# Patient Record
Sex: MALE | NOT HISPANIC OR LATINO | Employment: FULL TIME | URBAN - METROPOLITAN AREA
[De-identification: names, ages, dates, MRNs, and addresses within clinical notes are randomized per-mention and may not be internally consistent; named-entity substitution may affect disease eponyms.]

---

## 2024-08-15 ENCOUNTER — TRANSCRIBE ORDERS (OUTPATIENT)
Dept: ADMINISTRATIVE | Facility: HOSPITAL | Age: 64
End: 2024-08-15
Payer: COMMERCIAL

## 2024-08-15 DIAGNOSIS — E85.9 AMYLOIDOSIS, UNSPECIFIED TYPE: Primary | ICD-10-CM

## 2024-08-21 ENCOUNTER — TRANSCRIBE ORDERS (OUTPATIENT)
Dept: ADMINISTRATIVE | Facility: HOSPITAL | Age: 64
End: 2024-08-21
Payer: COMMERCIAL

## 2024-08-21 DIAGNOSIS — E85.9 AMYLOIDOSIS, UNSPECIFIED TYPE: Primary | ICD-10-CM

## 2024-08-22 ENCOUNTER — HOSPITAL ENCOUNTER (OUTPATIENT)
Dept: NUCLEAR MEDICINE | Facility: HOSPITAL | Age: 64
Discharge: HOME | End: 2024-08-22
Payer: COMMERCIAL

## 2024-08-22 DIAGNOSIS — E85.9 AMYLOIDOSIS, UNSPECIFIED TYPE: ICD-10-CM

## 2024-08-22 PROCEDURE — A9538 TC99M PYROPHOSPHATE: HCPCS | Performed by: INTERNAL MEDICINE

## 2024-08-22 PROCEDURE — 0 TECHNETIUM TC99M PYROPHOSPHATE: Performed by: INTERNAL MEDICINE

## 2024-08-22 PROCEDURE — 78800 RP LOCLZJ TUM 1 AREA 1 D IMG: CPT

## 2024-08-22 RX ADMIN — TECHNETIUM TC99M PYROPHOSPHATE 1 DOSE: 12 INJECTION INTRAVENOUS at 11:31

## 2024-09-30 ENCOUNTER — OFFICE VISIT (OUTPATIENT)
Dept: PULMONOLOGY | Facility: CLINIC | Age: 64
End: 2024-09-30
Payer: COMMERCIAL

## 2024-09-30 ENCOUNTER — LAB (OUTPATIENT)
Dept: LAB | Facility: HOSPITAL | Age: 64
End: 2024-09-30
Payer: COMMERCIAL

## 2024-09-30 VITALS
SYSTOLIC BLOOD PRESSURE: 142 MMHG | WEIGHT: 282.6 LBS | HEART RATE: 73 BPM | OXYGEN SATURATION: 93 % | DIASTOLIC BLOOD PRESSURE: 96 MMHG | HEIGHT: 73 IN | TEMPERATURE: 98.4 F | BODY MASS INDEX: 37.46 KG/M2

## 2024-09-30 DIAGNOSIS — Z91.09 ENVIRONMENTAL ALLERGIES: ICD-10-CM

## 2024-09-30 DIAGNOSIS — J45.50 SEVERE PERSISTENT ASTHMA WITHOUT COMPLICATION: ICD-10-CM

## 2024-09-30 DIAGNOSIS — J45.50 SEVERE PERSISTENT ASTHMA WITHOUT COMPLICATION: Primary | ICD-10-CM

## 2024-09-30 LAB
BASOPHILS # BLD MANUAL: 0 10*3/MM3 (ref 0–0.2)
BASOPHILS NFR BLD MANUAL: 0 % (ref 0–1.5)
DEPRECATED RDW RBC AUTO: 48.2 FL (ref 37–54)
EOSINOPHIL # BLD MANUAL: 0.23 10*3/MM3 (ref 0–0.4)
EOSINOPHIL NFR BLD MANUAL: 3.1 % (ref 0.3–6.2)
ERYTHROCYTE [DISTWIDTH] IN BLOOD BY AUTOMATED COUNT: 13.1 % (ref 12.3–15.4)
HCT VFR BLD AUTO: 44.4 % (ref 37.5–51)
HGB BLD-MCNC: 14.9 G/DL (ref 13–17.7)
LYMPHOCYTES # BLD MANUAL: 2.83 10*3/MM3 (ref 0.7–3.1)
LYMPHOCYTES NFR BLD MANUAL: 7.3 % (ref 5–12)
MCH RBC QN AUTO: 33.5 PG (ref 26.6–33)
MCHC RBC AUTO-ENTMCNC: 33.6 G/DL (ref 31.5–35.7)
MCV RBC AUTO: 99.8 FL (ref 79–97)
MONOCYTES # BLD: 0.54 10*3/MM3 (ref 0.1–0.9)
NEUTROPHILS # BLD AUTO: 3.74 10*3/MM3 (ref 1.7–7)
NEUTROPHILS NFR BLD MANUAL: 51 % (ref 42.7–76)
PLAT MORPH BLD: NORMAL
PLATELET # BLD AUTO: 209 10*3/MM3 (ref 140–450)
PMV BLD AUTO: 10 FL (ref 6–12)
POIKILOCYTOSIS BLD QL SMEAR: NORMAL
POLYCHROMASIA BLD QL SMEAR: NORMAL
RBC # BLD AUTO: 4.45 10*6/MM3 (ref 4.14–5.8)
VARIANT LYMPHS NFR BLD MANUAL: 38.5 % (ref 19.6–45.3)
WBC MORPH BLD: NORMAL
WBC NRBC COR # BLD AUTO: 7.34 10*3/MM3 (ref 3.4–10.8)

## 2024-09-30 PROCEDURE — 36415 COLL VENOUS BLD VENIPUNCTURE: CPT

## 2024-09-30 PROCEDURE — 85025 COMPLETE CBC W/AUTO DIFF WBC: CPT

## 2024-09-30 PROCEDURE — 82785 ASSAY OF IGE: CPT

## 2024-09-30 PROCEDURE — 99204 OFFICE O/P NEW MOD 45 MIN: CPT | Performed by: PHYSICIAN ASSISTANT

## 2024-09-30 PROCEDURE — 85007 BL SMEAR W/DIFF WBC COUNT: CPT

## 2024-09-30 RX ORDER — MONTELUKAST SODIUM 10 MG/1
TABLET ORAL
COMMUNITY
Start: 2024-09-25

## 2024-09-30 RX ORDER — CETIRIZINE HYDROCHLORIDE 10 MG/1
10 TABLET ORAL DAILY
COMMUNITY
Start: 2024-08-12

## 2024-09-30 RX ORDER — HYDROXYZINE PAMOATE 25 MG/1
CAPSULE ORAL
COMMUNITY
Start: 2024-09-25

## 2024-09-30 RX ORDER — ALBUTEROL SULFATE 90 UG/1
INHALANT RESPIRATORY (INHALATION)
COMMUNITY
Start: 2024-09-25

## 2024-09-30 RX ORDER — IPRATROPIUM BROMIDE AND ALBUTEROL SULFATE 2.5; .5 MG/3ML; MG/3ML
3 SOLUTION RESPIRATORY (INHALATION) EVERY 4 HOURS PRN
Qty: 360 ML | Refills: 3 | Status: SHIPPED | OUTPATIENT
Start: 2024-09-30

## 2024-09-30 RX ORDER — ROSUVASTATIN CALCIUM 20 MG/1
TABLET, COATED ORAL
COMMUNITY
Start: 2024-05-15

## 2024-09-30 RX ORDER — FLUTICASONE PROPIONATE 50 UG/1
1 SPRAY, METERED NASAL DAILY
COMMUNITY
Start: 2024-08-12

## 2024-09-30 RX ORDER — KETOCONAZOLE 20 MG/ML
SHAMPOO TOPICAL
COMMUNITY
Start: 2024-09-25

## 2024-09-30 RX ORDER — LOSARTAN POTASSIUM AND HYDROCHLOROTHIAZIDE 12.5; 1 MG/1; MG/1
TABLET ORAL
COMMUNITY
Start: 2024-09-25

## 2024-09-30 RX ORDER — EZETIMIBE 10 MG/1
TABLET ORAL
COMMUNITY
Start: 2024-09-25 | End: 2024-09-30

## 2024-09-30 RX ORDER — FLUTICASONE FUROATE, UMECLIDINIUM BROMIDE AND VILANTEROL TRIFENATATE 200; 62.5; 25 UG/1; UG/1; UG/1
POWDER RESPIRATORY (INHALATION)
COMMUNITY
Start: 2024-09-04

## 2024-09-30 RX ORDER — LEVOCETIRIZINE DIHYDROCHLORIDE 5 MG/1
TABLET, FILM COATED ORAL
COMMUNITY
Start: 2024-09-25 | End: 2024-09-30

## 2024-09-30 RX ORDER — FUROSEMIDE 20 MG
TABLET ORAL
COMMUNITY
Start: 2024-08-14

## 2024-09-30 RX ORDER — KETOCONAZOLE 20 MG/G
CREAM TOPICAL
COMMUNITY
Start: 2024-09-13

## 2024-09-30 NOTE — PROGRESS NOTES
Subjective      Chief Complaint  Cough (For years, sometimes productive. )    Subjective      History of Present Illness  Justin Miller is a 63 y.o. male who presents today to [facility] with past medical history of essential hypertension, GERD, and asthma who presents today for Cough (For years, sometimes productive. ). This visit is a initial evaluation  appointment.     Cough (For years, sometimes productive. ):  Patient was referred by allergy and asthma for further evaluation of cough. His wife is present during exam and provides supplemental history. No previous smoking history. Denies having COVID-19 in the past.     He states that he has a cough that has been going on for approximately 10 years. He states that he has been started on treatment for allergies which has seemed to help some but the cough has still not resolved. He states that at times the cough is productive. He also has congestion and post nasal drainage. The cough is worse in the morning and persists throughout the day. It is aggravated when he has to do a lot of talking which affects his ability to adequately perform his job. He denies any significant shortness of breath. He reports he is allergic to types of trees, grass, peanut butter, soy, and tomatoes. He states that he does try to avoid some of his trigger but he states that he still eats peanut butter and tomatoes at times. He states that when he was started on immunotherapy that he could initially notice an improvement in his cough but now seems that it isn't as effective.     He was recently switched from Wixela to Trelegy 200 mcg and feels that he was able to tell an improvement in his symptoms. He also has an albuterol inhaler which he uses as needed and feels that it provides him with symptomatic relief. He takes Zyrtec and Singulair daily. He uses Flonase nasal spray daily. He was previously on Azelastine nasal spray but  "discontinued this as he wasn't noticing much symptomatic improvement. He also gets immunotherapy weekly from [facility].       Current Outpatient Medications:     albuterol sulfate  (90 Base) MCG/ACT inhaler, , Disp: , Rfl:     cetirizine (zyrTEC) 10 MG tablet, Take 1 tablet by mouth Daily., Disp: , Rfl:     fluticasone (FLONASE) 50 MCG/ACT nasal spray, Administer 1 spray into the nostril(s) as directed by provider Daily., Disp: , Rfl:     furosemide (LASIX) 20 MG tablet, , Disp: , Rfl:     hydrOXYzine pamoate (VISTARIL) 25 MG capsule, , Disp: , Rfl:     ketoconazole (NIZORAL) 2 % cream, , Disp: , Rfl:     ketoconazole (NIZORAL) 2 % shampoo, , Disp: , Rfl:     losartan-hydrochlorothiazide (HYZAAR) 100-12.5 MG per tablet, , Disp: , Rfl:     montelukast (SINGULAIR) 10 MG tablet, , Disp: , Rfl:     omeprazole (priLOSEC) 20 MG capsule, , Disp: , Rfl:     rosuvastatin (CRESTOR) 20 MG tablet, , Disp: , Rfl:     Trelegy Ellipta 200-62.5-25 MCG/ACT inhaler, , Disp: , Rfl:     ipratropium-albuterol (DUO-NEB) 0.5-2.5 mg/3 ml nebulizer, Take 3 mL by nebulization Every 4 (Four) Hours As Needed for Wheezing or Shortness of Air., Disp: 360 mL, Rfl: 3      Allergies   Allergen Reactions    Azithromycin Headache       Objective     Objective   Vital Signs:  /96   Pulse 73   Temp 98.4 °F (36.9 °C)   Ht 185.4 cm (73\")   Wt 128 kg (282 lb 9.6 oz)   SpO2 93%   BMI 37.28 kg/m²   Estimated body mass index is 37.28 kg/m² as calculated from the following:    Height as of this encounter: 185.4 cm (73\").    Weight as of this encounter: 128 kg (282 lb 9.6 oz).    Past Medical History:   Diagnosis Date    Acid reflux     Childhood asthma     Hypertension      History reviewed. No pertinent surgical history.  Social History     Socioeconomic History    Marital status:    Tobacco Use    Smoking status: Never     Passive exposure: Never    Smokeless tobacco: Never   Vaping Use    Vaping status: Never Used " "  Substance and Sexual Activity    Alcohol use: Not Currently    Drug use: Not Currently    Sexual activity: Defer      Physical Exam  Constitutional:       General: He is awake.      Appearance: Normal appearance. He is obese.   HENT:      Head: Normocephalic and atraumatic.      Nose: Nose normal. Congestion present.      Mouth/Throat:      Mouth: Mucous membranes are moist.      Pharynx: Oropharynx is clear.   Eyes:      Conjunctiva/sclera: Conjunctivae normal.      Pupils: Pupils are equal, round, and reactive to light.   Cardiovascular:      Rate and Rhythm: Normal rate and regular rhythm.      Pulses: Normal pulses.      Heart sounds: Normal heart sounds. No murmur heard.     No friction rub. No gallop.   Pulmonary:      Effort: Pulmonary effort is normal. No tachypnea, accessory muscle usage or respiratory distress.      Breath sounds: Normal breath sounds. No decreased breath sounds, wheezing, rhonchi or rales.   Musculoskeletal:         General: Normal range of motion.      Cervical back: Full passive range of motion without pain and normal range of motion.   Skin:     General: Skin is warm and dry.   Neurological:      General: No focal deficit present.      Mental Status: He is alert. Mental status is at baseline.   Psychiatric:         Mood and Affect: Mood normal.         Behavior: Behavior normal. Behavior is cooperative.         Thought Content: Thought content normal.        Result Review :  The following labs and radiology results have been reviewed.    Lab Review:   No results found for: \"FEV1\", \"FVC\", \"RHL4PGA\", \"TLC\", \"DLCO\"  No visits with results within 3 Month(s) from this visit.   Latest known visit with results is:   No results found for any previous visit.        Imaging Results (Most Recent)       None            Radiology Review:   Imaging Results (Last 72 Hours)       ** No results found for the last 72 hours. **          Reviewed PFT from 06/01/2023      Reviewed chest XR report from " 09/20/2023       Powered by [employer]  Outside Information  Results  XR Chest 2 View (Order [id number])     XR Chest 2 View  Order: [id number]  Impression       Subtle interstitial opacities in the right perihilar region, concerning for pneumonia in the appropriate clinical setting.     Created by: Uriel Ryan MD   Signed by: Uriel Ryan MD   Signed on: 9/20/2023 8:20 AM   Location: [facility]            Narrative        EXAM: TWO-VIEW CHEST       INDICATION: Fever and chills.     COMPARISON: 1/22/2023.     FINDINGS:     LUNGS/PLEURA: Subtle interstitial opacities in the right perihilar region. No effusions.     HEART/MEDIASTINUM: Normal in size and contour. Atherosclerotic calcification aortic arch.     SUPPORT DEVICES: None.     PNEUMOTHORAX: None.     OSSEOUS STRUCTURES: No acute fracture or destructive lesion.     ADDITIONAL FINDINGS: None.  Exam End: 09/19/23 12:52 Last Resulted: 09/20/23 08:20   Received From: [facility] [facility]  Result Received: 08/22/24 11:23    View Encounter        Received Information          Assessment / Plan         Assessment   Diagnoses and all orders for this visit:    1. Severe persistent asthma without complication (Primary)  2. Environmental allergies  Previous PFT from 06/2023 revealed moderate obstructive lung disease with significant bronchodilator improvement (FEV1/FVC 54% and FEV1 62%; prebronchodilator FEV1 25%).   Will consider obtaining repeat PFT/spirometry at future visit.   Will obtain CBC and IgE level to evaluate need for biologic therapy.   Continue Trelegy 200 mcg 1 puff daily.   Continue albuterol inhaler as needed.   Will order DuoNeb treatments for as needed use.   Continue Singulair 10 mg nightly (advised to switch from daytime to nighttime use to ensure peak serum levels occur with symptom onset).   Continue Zyrtec 10 mg daily.   Continue immunotherapy with [facility].   Advised to avoid environemental triggers as possible.     -     CBC & Differential; Future  -     IgE  Level; Future  -     ipratropium-albuterol (DUO-NEB) 0.5-2.5 mg/3 ml nebulizer; Take 3 mL by nebulization Every 4 (Four) Hours As Needed for Wheezing or Shortness of Air.  Dispense: 360 mL; Refill: 3    Medications Discontinued During This Encounter   Medication Reason    ezetimibe (ZETIA) 10 MG tablet *Therapy completed    levocetirizine (XYZAL) 5 MG tablet       New Medications Ordered This Visit   Medications    ipratropium-albuterol (DUO-NEB) 0.5-2.5 mg/3 ml nebulizer     Sig: Take 3 mL by nebulization Every 4 (Four) Hours As Needed for Wheezing or Shortness of Air.     Dispense:  360 mL     Refill:  3     I spent 45 minutes caring for Justin on this date of service. This time includes time spent by me in the following activities:preparing for the visit, reviewing tests, obtaining and/or reviewing a separately obtained history, performing a medically appropriate examination and/or evaluation , counseling and educating the patient/family/caregiver, ordering medications, tests, or procedures, referring and communicating with other health care professionals , documenting information in the medical record, independently interpreting results and communicating that information with the patient/family/caregiver, and care coordination    Follow Up   Return in about 4 weeks (around 10/28/2024), or if symptoms worsen or fail to improve, for Next scheduled follow up.    Patient was given instructions and counseling regarding his condition or for health maintenance advice. Please see specific information pulled into the AVS if appropriate.       This document has been electronically signed by Lety Harvey PA-C   September 30, 2024 10:03 EDT    Dictated Utilizing Dragon Dictation: Part of this note may be an electronic transcription/translation of spoken language to printed text using the Dragon Dictation System.

## 2024-10-02 ENCOUNTER — TELEPHONE (OUTPATIENT)
Age: 64
End: 2024-10-02
Payer: COMMERCIAL

## 2024-10-02 DIAGNOSIS — R76.8 ELEVATED IGE LEVEL: ICD-10-CM

## 2024-10-02 DIAGNOSIS — J45.50 SEVERE PERSISTENT ASTHMA WITHOUT COMPLICATION: Primary | ICD-10-CM

## 2024-10-02 NOTE — TELEPHONE ENCOUNTER
Attempted to contact patient to discuss lab results but was not able to reach and unable to leave a voicemail. Will send letter to notify him to contact office to further discuss results of labs.

## 2024-10-03 LAB — IGE SERPL-ACNC: 923 IU/ML (ref 6–495)

## 2024-10-04 ENCOUNTER — PATIENT ROUNDING (BHMG ONLY) (OUTPATIENT)
Dept: PULMONOLOGY | Facility: CLINIC | Age: 64
End: 2024-10-04
Payer: COMMERCIAL

## 2024-10-04 NOTE — PROGRESS NOTES
October 4, 2024    Hello, may I speak with Justin Miller?    My name is Julissa      I am  with [facility]  [address]  [place] [address] [phone].    Before we get started may I verify your date of birth? 1960    I am calling to officially welcome you to our practice and ask about your recent visit. Is this a good time to talk? yes    Tell me about your visit with us. What things went well?  It was all find       We're always looking for ways to make our patients' experiences even better. Do you have recommendations on ways we may improve?  no    Overall were you satisfied with your first visit to our practice? yes       I appreciate you taking the time to speak with me today. Is there anything else I can do for you? no      Thank you, and have a great day.

## 2024-10-07 NOTE — TELEPHONE ENCOUNTER
Patient returned call to discuss lab results. Labs revealed elevated IgE level but otherwise overall normal. Discussed that due to this level being elevated that this could contribute to his current symptoms. Discussed starting on Dupixent for treatment and patient is agreeable.

## 2024-10-10 ENCOUNTER — TELEPHONE (OUTPATIENT)
Dept: PHARMACY | Facility: HOSPITAL | Age: 64
End: 2024-10-10
Payer: COMMERCIAL

## 2024-10-10 RX ORDER — DUPILUMAB 300 MG/2ML
600 INJECTION, SOLUTION SUBCUTANEOUS ONCE
Qty: 4 ML | Refills: 0 | Status: SHIPPED | OUTPATIENT
Start: 2024-10-10 | End: 2024-10-10

## 2024-10-10 RX ORDER — DUPILUMAB 300 MG/2ML
300 INJECTION, SOLUTION SUBCUTANEOUS
Qty: 2 ML | Refills: 5 | Status: SHIPPED | OUTPATIENT
Start: 2024-10-25

## 2024-10-31 ENCOUNTER — OFFICE VISIT (OUTPATIENT)
Dept: PULMONOLOGY | Facility: CLINIC | Age: 64
End: 2024-10-31
Payer: COMMERCIAL

## 2024-10-31 VITALS
HEART RATE: 75 BPM | BODY MASS INDEX: 38.65 KG/M2 | DIASTOLIC BLOOD PRESSURE: 90 MMHG | HEIGHT: 73 IN | WEIGHT: 291.6 LBS | SYSTOLIC BLOOD PRESSURE: 138 MMHG | TEMPERATURE: 98.2 F | OXYGEN SATURATION: 97 %

## 2024-10-31 DIAGNOSIS — J45.50 SEVERE PERSISTENT ASTHMA WITHOUT COMPLICATION: Primary | ICD-10-CM

## 2024-10-31 DIAGNOSIS — Z91.09 ENVIRONMENTAL ALLERGIES: ICD-10-CM

## 2024-10-31 RX ORDER — PREDNISONE 20 MG/1
40 TABLET ORAL DAILY
Qty: 10 TABLET | Refills: 0 | Status: SHIPPED | OUTPATIENT
Start: 2024-10-31

## 2024-10-31 RX ORDER — CYANOCOBALAMIN 1000 UG/ML
INJECTION, SOLUTION INTRAMUSCULAR; SUBCUTANEOUS
COMMUNITY
Start: 2024-10-21

## 2024-10-31 RX ORDER — LEVOCETIRIZINE DIHYDROCHLORIDE 5 MG/1
TABLET, FILM COATED ORAL
COMMUNITY
Start: 2024-10-21

## 2024-10-31 NOTE — PROGRESS NOTES
Subjective      Chief Complaint  Severe persistent asthma without complication    Subjective      History of Present Illness  Justin Miller is a 63 y.o. male who presents today to [facility] with past medical history of essential hypertension, GERD, and asthma who presents today for Severe persistent asthma without complication. This visit is a follow up appointment.     Severe persistent asthma without complication:  Patient was referred by allergy and asthma for further evaluation of cough. No previous smoking history. Denies having COVID-19 in the past. He has had a cough that has been going on for approximately 10 years. He states that he has been started on treatment for allergies which has seemed to help some but the cough has still not resolved. He states that at times the cough is productive. He also has congestion and post nasal drainage. The cough is worse in the morning and persists throughout the day. It is aggravated when he has to do a lot of talking which affects his ability to adequately perform his job. He denies any significant shortness of breath. He reports he is allergic to types of trees, grass, peanut butter, soy, and tomatoes. He states that he does try to avoid some of his trigger but he states that he still eats peanut butter and tomatoes at times. He states that when he was started on immunotherapy that he could initially notice an improvement in his cough but now seems that it isn't as effective. He was recently switched from Wixela to Trelegy 200 mcg and feels that he was able to tell an improvement in his symptoms. He also has an albuterol inhaler which he uses as needed and feels that it provides him with symptomatic relief. He takes Zyrtec and Singulair daily. He uses Flonase nasal spray daily. He was previously on Azelastine nasal spray but discontinued this as he wasn't noticing much symptomatic improvement. He also gets  immunotherapy weekly from [facility].     Interval history:  Patient presents today for his follow-up appointment. He states that his symptoms overall have remained the same since his previous visit. We have discussed starting biologic injections with Dupixent since his last visit and he is agreeable. The PA was approved and sent to [facility]. He states that he received a call from the pharmacy but was told that the medications had ~$1000 copay and they would try to get him enrolled in an assistance program but hasn't heard any feedback about this yet. He states that he continues to have a cough that is intermittently productive. He does have a cough present during the visit which sounds wet. He continues to use his Trelegy 200 mcg inhaler once daily. He has an albuterol inhaler to use as needed. He takes Zyrtec, Singulair, and Flonase nasal spray daily. He continues to receive allergy injections from [facility] weekly.       Current Outpatient Medications:     albuterol sulfate  (90 Base) MCG/ACT inhaler, , Disp: , Rfl:     cetirizine (zyrTEC) 10 MG tablet, Take 1 tablet by mouth Daily., Disp: , Rfl:     cyanocobalamin 1000 MCG/ML injection, , Disp: , Rfl:     Dupilumab (Dupixent) 300 MG/2ML solution pen-injector, Inject 2 mL under the skin into the appropriate area as directed Every 14 (Fourteen) Days., Disp: 2 mL, Rfl: 5    fluticasone (FLONASE) 50 MCG/ACT nasal spray, Administer 1 spray into the nostril(s) as directed by provider Daily., Disp: , Rfl:     furosemide (LASIX) 20 MG tablet, , Disp: , Rfl:     hydrOXYzine pamoate (VISTARIL) 25 MG capsule, , Disp: , Rfl:     ipratropium-albuterol (DUO-NEB) 0.5-2.5 mg/3 ml nebulizer, Take 3 mL by nebulization Every 4 (Four) Hours As Needed for Wheezing or Shortness of Air., Disp: 360 mL, Rfl: 3    ketoconazole (NIZORAL) 2 % cream, , Disp: , Rfl:     ketoconazole (NIZORAL) 2 % shampoo, , Disp: , Rfl:     levocetirizine (XYZAL) 5  "MG tablet, , Disp: , Rfl:     losartan-hydrochlorothiazide (HYZAAR) 100-12.5 MG per tablet, , Disp: , Rfl:     montelukast (SINGULAIR) 10 MG tablet, , Disp: , Rfl:     omeprazole (priLOSEC) 20 MG capsule, , Disp: , Rfl:     rosuvastatin (CRESTOR) 20 MG tablet, , Disp: , Rfl:     Trelegy Ellipta 200-62.5-25 MCG/ACT inhaler, , Disp: , Rfl:     predniSONE (DELTASONE) 20 MG tablet, Take 2 tablets by mouth Daily., Disp: 10 tablet, Rfl: 0      Allergies   Allergen Reactions    Azithromycin Headache       Objective     Objective   Vital Signs:  /90   Pulse 75   Temp 98.2 °F (36.8 °C)   Ht 185.4 cm (73\")   Wt 132 kg (291 lb 9.6 oz)   SpO2 97%   BMI 38.47 kg/m²   Estimated body mass index is 38.47 kg/m² as calculated from the following:    Height as of this encounter: 185.4 cm (73\").    Weight as of this encounter: 132 kg (291 lb 9.6 oz).    Past Medical History:   Diagnosis Date    Acid reflux     Asthma     Childhood asthma     Hypertension      History reviewed. No pertinent surgical history.  Social History     Socioeconomic History    Marital status:    Tobacco Use    Smoking status: Never     Passive exposure: Never    Smokeless tobacco: Never   Vaping Use    Vaping status: Never Used   Substance and Sexual Activity    Alcohol use: Not Currently    Drug use: Not Currently    Sexual activity: Defer      Physical Exam  Constitutional:       General: He is awake.      Appearance: Normal appearance. He is obese.   HENT:      Head: Normocephalic and atraumatic.      Nose: Nose normal.      Mouth/Throat:      Mouth: Mucous membranes are moist.      Pharynx: Oropharynx is clear.   Eyes:      Conjunctiva/sclera: Conjunctivae normal.      Pupils: Pupils are equal, round, and reactive to light.   Cardiovascular:      Rate and Rhythm: Normal rate and regular rhythm.      Pulses: Normal pulses.      Heart sounds: Normal heart sounds. No murmur heard.     No friction rub. No gallop.   Pulmonary:      Effort: " "Pulmonary effort is normal. No tachypnea, accessory muscle usage or respiratory distress.      Breath sounds: Normal breath sounds. No decreased breath sounds, wheezing, rhonchi or rales.   Musculoskeletal:      Cervical back: Full passive range of motion without pain and normal range of motion.   Skin:     General: Skin is warm and dry.   Neurological:      General: No focal deficit present.      Mental Status: He is alert. Mental status is at baseline.   Psychiatric:         Behavior: Behavior is cooperative.        Result Review :  The following labs and radiology results have been reviewed.    Lab Review:   No results found for: \"FEV1\", \"FVC\", \"XHV7CXB\", \"TLC\", \"DLCO\"  Lab on 09/30/2024   Component Date Value Ref Range Status    IgE 09/30/2024 923 (H)  6 - 495 IU/mL Final    WBC 09/30/2024 7.34  3.40 - 10.80 10*3/mm3 Final    RBC 09/30/2024 4.45  4.14 - 5.80 10*6/mm3 Final    Hemoglobin 09/30/2024 14.9  13.0 - 17.7 g/dL Final    Hematocrit 09/30/2024 44.4  37.5 - 51.0 % Final    MCV 09/30/2024 99.8 (H)  79.0 - 97.0 fL Final    MCH 09/30/2024 33.5 (H)  26.6 - 33.0 pg Final    MCHC 09/30/2024 33.6  31.5 - 35.7 g/dL Final    RDW 09/30/2024 13.1  12.3 - 15.4 % Final    RDW-SD 09/30/2024 48.2  37.0 - 54.0 fl Final    MPV 09/30/2024 10.0  6.0 - 12.0 fL Final    Platelets 09/30/2024 209  140 - 450 10*3/mm3 Final    Neutrophil % 09/30/2024 51.0  42.7 - 76.0 % Final    Lymphocyte % 09/30/2024 38.5  19.6 - 45.3 % Final    Monocyte % 09/30/2024 7.3  5.0 - 12.0 % Final    Eosinophil % 09/30/2024 3.1  0.3 - 6.2 % Final    Basophil % 09/30/2024 0.0  0.0 - 1.5 % Final    Neutrophils Absolute 09/30/2024 3.74  1.70 - 7.00 10*3/mm3 Final    Lymphocytes Absolute 09/30/2024 2.83  0.70 - 3.10 10*3/mm3 Final    Monocytes Absolute 09/30/2024 0.54  0.10 - 0.90 10*3/mm3 Final    Eosinophils Absolute 09/30/2024 0.23  0.00 - 0.40 10*3/mm3 Final    Basophils Absolute 09/30/2024 0.00  0.00 - 0.20 10*3/mm3 Final    Poikilocytes 09/30/2024 " Mod/2+  None Seen Final    Polychromasia 09/30/2024 Slight/1+  None Seen Final    WBC Morphology 09/30/2024 Normal  Normal Final    Platelet Morphology 09/30/2024 Normal  Normal Final        Imaging Results (Most Recent)       None            Radiology Review:   Imaging Results (Last 72 Hours)       ** No results found for the last 72 hours. **          Reviewed PFT from 06/01/2023      Reviewed chest XR report from 09/20/2023       [facility]  Outside Information  Results  XR Chest 2 View (Order [id number])     XR Chest 2 View  Order: [id number]  Impression       Subtle interstitial opacities in the right perihilar region, concerning for pneumonia in the appropriate clinical setting.     Created by: Uriel Ryan MD   Signed by: Uriel Ryan MD   Signed on: 9/20/2023 8:20 AM   Location: [facility]            Narrative        EXAM: TWO-VIEW CHEST       INDICATION: Fever and chills.     COMPARISON: 1/22/2023.     FINDINGS:     LUNGS/PLEURA: Subtle interstitial opacities in the right perihilar region. No effusions.     HEART/MEDIASTINUM: Normal in size and contour. Atherosclerotic calcification aortic arch.     SUPPORT DEVICES: None.     PNEUMOTHORAX: None.     OSSEOUS STRUCTURES: No acute fracture or destructive lesion.     ADDITIONAL FINDINGS: None.  Exam End: 09/19/23 12:52 Last Resulted: 09/20/23 08:20   Received From: [facility]  Result Received: 08/22/24 11:23    View Encounter        Received Information          Assessment / Plan         Assessment   Diagnoses and all orders for this visit:    1. Severe persistent asthma without complication (Primary)  2. Environmental allergies  Previous PFT from 06/2023 revealed moderate obstructive lung disease with significant bronchodilator improvement (FEV1/FVC 54% and FEV1 62%; prebronchodilator FEV1 25%).   Previous labs notable for elevated IgE level (923). Eosinophil count normal (0.23) but still qualifying for biologic injections.   Previously discussed starting on Dupixent  injections and patient agreeable. PA approved and sent to [facility]. Patient received call and copay ~$1000. Contacted pharmacy following visit and provided with copay card and approved for $0. Pharmacy to reach out to patient to set up shipping.   Continue Trelegy 200 mcg 1 puff daily.   Continue Albuterol inhaler as needed.   Continue DuoNeb treatments as needed.   Continue Singulair 10 mg nightly.   Continue Zyrtec 10 mg daily.   Continue Flonase nasal spray daily.   Continue allergy injections through [facility].   Will order prednisone 40 mg x 5 days.    -     predniSONE (DELTASONE) 20 MG tablet; Take 2 tablets by mouth Daily.  Dispense: 10 tablet; Refill: 0    There are no discontinued medications.     New Medications Ordered This Visit   Medications    predniSONE (DELTASONE) 20 MG tablet     Sig: Take 2 tablets by mouth Daily.     Dispense:  10 tablet     Refill:  0     I spent >40 minutes caring for Justin on this date of service. This time includes time spent by me in the following activities:preparing for the visit, reviewing tests, obtaining and/or reviewing a separately obtained history, performing a medically appropriate examination and/or evaluation , counseling and educating the patient/family/caregiver, ordering medications, tests, or procedures, referring and communicating with other health care professionals , documenting information in the medical record, independently interpreting results and communicating that information with the patient/family/caregiver, and care coordination    Follow Up   Return in about 3 months (around 1/31/2025), or if symptoms worsen or fail to improve, for Next scheduled follow up.    Patient was given instructions and counseling regarding his condition or for health maintenance advice. Please see specific information pulled into the AVS if appropriate.       This document has been electronically signed by Lety Harvey PA-C   November 1,  2024 00:06 EDT    Dictated Utilizing Dragon Dictation: Part of this note may be an electronic transcription/translation of spoken language to printed text using the Dragon Dictation System.

## 2025-01-31 ENCOUNTER — OFFICE VISIT (OUTPATIENT)
Dept: PULMONOLOGY | Facility: CLINIC | Age: 65
End: 2025-01-31
Payer: COMMERCIAL

## 2025-01-31 VITALS
OXYGEN SATURATION: 95 % | HEART RATE: 86 BPM | TEMPERATURE: 99 F | HEIGHT: 73 IN | DIASTOLIC BLOOD PRESSURE: 78 MMHG | BODY MASS INDEX: 38.86 KG/M2 | WEIGHT: 293.2 LBS | SYSTOLIC BLOOD PRESSURE: 122 MMHG

## 2025-01-31 DIAGNOSIS — Z91.09 ENVIRONMENTAL ALLERGIES: ICD-10-CM

## 2025-01-31 DIAGNOSIS — J45.50 SEVERE PERSISTENT ASTHMA WITHOUT COMPLICATION: Primary | ICD-10-CM

## 2025-01-31 PROCEDURE — 99213 OFFICE O/P EST LOW 20 MIN: CPT | Performed by: PHYSICIAN ASSISTANT

## 2025-01-31 RX ORDER — FLUTICASONE FUROATE, UMECLIDINIUM BROMIDE AND VILANTEROL TRIFENATATE 200; 62.5; 25 UG/1; UG/1; UG/1
1 POWDER RESPIRATORY (INHALATION)
Qty: 60 EACH | Refills: 5 | Status: SHIPPED | OUTPATIENT
Start: 2025-01-31

## 2025-01-31 RX ORDER — FLUTICASONE PROPIONATE AND SALMETEROL 500; 50 UG/1; UG/1
POWDER RESPIRATORY (INHALATION)
COMMUNITY
Start: 2025-01-22 | End: 2025-01-31

## 2025-01-31 RX ORDER — LOSARTAN POTASSIUM AND HYDROCHLOROTHIAZIDE 25; 100 MG/1; MG/1
TABLET ORAL
COMMUNITY
Start: 2025-01-22

## 2025-01-31 NOTE — PROGRESS NOTES
Subjective      Chief Complaint  Severe persistent asthma    Subjective      History of Present Illness  Justin Miller is a 64 y.o. male who presents today to [facility] with past medical history of essential hypertension, GERD, and asthma who presents today for Severe persistent asthma. This visit is a follow up appointment.     Severe persistent asthma:  Patient was referred by allergy and asthma for further evaluation of cough. No previous smoking history. Denies having COVID-19 in the past. He has had a cough that has been going on for approximately 10 years. He states that he has been started on treatment for allergies which has seemed to help some but the cough has still not resolved. He states that at times the cough is productive. He also has congestion and post nasal drainage. The cough is worse in the morning and persists throughout the day. It is aggravated when he has to do a lot of talking which affects his ability to adequately perform his job. He denies any significant shortness of breath. He reports he is allergic to types of trees, grass, peanut butter, soy, and tomatoes. He states that he does try to avoid some of his trigger but he states that he still eats peanut butter and tomatoes at times. He states that when he was started on immunotherapy that he could initially notice an improvement in his cough but now seems that it isn't as effective. He was recently switched from Wixela to Trelegy 200 mcg and feels that he was able to tell an improvement in his symptoms. He also has an albuterol inhaler which he uses as needed and feels that it provides him with symptomatic relief. He takes Zyrtec and Singulair daily. He uses Flonase nasal spray daily. He was previously on Azelastine nasal spray but discontinued this as he wasn't noticing much symptomatic improvement. He also gets immunotherapy weekly from [facility].     Interval  history:  Patient presents today for his follow-up appointment. He states that he is doing well. He feels that his breathing has improved since his initial visit. He reports that he still has a cough but states that it is improved. He continues to use Trelegy 200 mcg 1 puff daily. He has an albuterol inhaler and nebulizer treatments but doesn't have to use them frequently. He received his initial dose of Dupixent and feels that after he took the dose that he noticed an improvement in his cough but after he called back to request a refill they were requesting a high copay amount so he didn't get his second dose. He states that he took the initial dose about 1 month ago. He continues to take Zyrtec, Singulair, and Flonase nasal spray daily. He continues to receive allergy injections weekly.       Current Outpatient Medications:     albuterol sulfate  (90 Base) MCG/ACT inhaler, , Disp: , Rfl:     cetirizine (zyrTEC) 10 MG tablet, Take 1 tablet by mouth Daily., Disp: , Rfl:     cyanocobalamin 1000 MCG/ML injection, , Disp: , Rfl:     fluticasone (FLONASE) 50 MCG/ACT nasal spray, Administer 1 spray into the nostril(s) as directed by provider Daily., Disp: , Rfl:     furosemide (LASIX) 20 MG tablet, , Disp: , Rfl:     hydrOXYzine pamoate (VISTARIL) 25 MG capsule, , Disp: , Rfl:     ipratropium-albuterol (DUO-NEB) 0.5-2.5 mg/3 ml nebulizer, Take 3 mL by nebulization Every 4 (Four) Hours As Needed for Wheezing or Shortness of Air., Disp: 360 mL, Rfl: 3    ketoconazole (NIZORAL) 2 % cream, , Disp: , Rfl:     ketoconazole (NIZORAL) 2 % shampoo, , Disp: , Rfl:     levocetirizine (XYZAL) 5 MG tablet, , Disp: , Rfl:     losartan-hydrochlorothiazide (HYZAAR) 100-25 MG per tablet, , Disp: , Rfl:     montelukast (SINGULAIR) 10 MG tablet, , Disp: , Rfl:     omeprazole (priLOSEC) 20 MG capsule, , Disp: , Rfl:     rosuvastatin (CRESTOR) 20 MG tablet, , Disp: , Rfl:     Trelegy Ellipta 200-62.5-25 MCG/ACT inhaler, Inhale 1 puff  "Daily., Disp: 60 each, Rfl: 5    Dupilumab (Dupixent) 300 MG/2ML solution pen-injector, Inject 2 mL under the skin into the appropriate area as directed Every 14 (Fourteen) Days. (Patient not taking: Reported on 1/31/2025), Disp: 2 mL, Rfl: 5      Allergies   Allergen Reactions    Azithromycin Headache       Objective     Objective   Vital Signs:  /78   Pulse 86   Temp 99 °F (37.2 °C)   Ht 185.4 cm (73\")   Wt 133 kg (293 lb 3.2 oz)   SpO2 95%   BMI 38.68 kg/m²   Estimated body mass index is 38.68 kg/m² as calculated from the following:    Height as of this encounter: 185.4 cm (73\").    Weight as of this encounter: 133 kg (293 lb 3.2 oz).    Past Medical History:   Diagnosis Date    Acid reflux     Asthma     Childhood asthma     Hypertension      History reviewed. No pertinent surgical history.  Social History     Socioeconomic History    Marital status:    Tobacco Use    Smoking status: Never     Passive exposure: Never    Smokeless tobacco: Never   Vaping Use    Vaping status: Never Used   Substance and Sexual Activity    Alcohol use: Not Currently    Drug use: Not Currently    Sexual activity: Defer      Physical Exam  Constitutional:       General: He is awake.      Appearance: Normal appearance. He is obese.   HENT:      Head: Normocephalic and atraumatic.      Nose: Nose normal.      Mouth/Throat:      Mouth: Mucous membranes are moist.      Pharynx: Oropharynx is clear.   Eyes:      Conjunctiva/sclera: Conjunctivae normal.      Pupils: Pupils are equal, round, and reactive to light.   Cardiovascular:      Rate and Rhythm: Normal rate and regular rhythm.      Pulses: Normal pulses.      Heart sounds: Normal heart sounds. No murmur heard.     No friction rub. No gallop.   Pulmonary:      Effort: Pulmonary effort is normal. No tachypnea, accessory muscle usage or respiratory distress.      Breath sounds: Normal breath sounds. No decreased breath sounds, wheezing, rhonchi or rales. " "  Musculoskeletal:         General: Normal range of motion.      Cervical back: Full passive range of motion without pain and normal range of motion.   Skin:     General: Skin is warm and dry.   Neurological:      General: No focal deficit present.      Mental Status: He is alert. Mental status is at baseline.   Psychiatric:         Mood and Affect: Mood normal.         Behavior: Behavior normal. Behavior is cooperative.         Thought Content: Thought content normal.        Result Review :  The following labs and radiology results have been reviewed.    Lab Review:   No results found for: \"FEV1\", \"FVC\", \"QYR2SWU\", \"TLC\", \"DLCO\"  No visits with results within 3 Month(s) from this visit.   Latest known visit with results is:   Lab on 09/30/2024   Component Date Value Ref Range Status    IgE 09/30/2024 923 (H)  6 - 495 IU/mL Final    WBC 09/30/2024 7.34  3.40 - 10.80 10*3/mm3 Final    RBC 09/30/2024 4.45  4.14 - 5.80 10*6/mm3 Final    Hemoglobin 09/30/2024 14.9  13.0 - 17.7 g/dL Final    Hematocrit 09/30/2024 44.4  37.5 - 51.0 % Final    MCV 09/30/2024 99.8 (H)  79.0 - 97.0 fL Final    MCH 09/30/2024 33.5 (H)  26.6 - 33.0 pg Final    MCHC 09/30/2024 33.6  31.5 - 35.7 g/dL Final    RDW 09/30/2024 13.1  12.3 - 15.4 % Final    RDW-SD 09/30/2024 48.2  37.0 - 54.0 fl Final    MPV 09/30/2024 10.0  6.0 - 12.0 fL Final    Platelets 09/30/2024 209  140 - 450 10*3/mm3 Final    Neutrophil % 09/30/2024 51.0  42.7 - 76.0 % Final    Lymphocyte % 09/30/2024 38.5  19.6 - 45.3 % Final    Monocyte % 09/30/2024 7.3  5.0 - 12.0 % Final    Eosinophil % 09/30/2024 3.1  0.3 - 6.2 % Final    Basophil % 09/30/2024 0.0  0.0 - 1.5 % Final    Neutrophils Absolute 09/30/2024 3.74  1.70 - 7.00 10*3/mm3 Final    Lymphocytes Absolute 09/30/2024 2.83  0.70 - 3.10 10*3/mm3 Final    Monocytes Absolute 09/30/2024 0.54  0.10 - 0.90 10*3/mm3 Final    Eosinophils Absolute 09/30/2024 0.23  0.00 - 0.40 10*3/mm3 Final    Basophils Absolute 09/30/2024 0.00  " 0.00 - 0.20 10*3/mm3 Final    Poikilocytes 09/30/2024 Mod/2+  None Seen Final    Polychromasia 09/30/2024 Slight/1+  None Seen Final    WBC Morphology 09/30/2024 Normal  Normal Final    Platelet Morphology 09/30/2024 Normal  Normal Final        Imaging Results (Most Recent)       None            Radiology Review:   Imaging Results (Last 72 Hours)       ** No results found for the last 72 hours. **          Reviewed PFT from 06/01/2023      Reviewed chest XR report from 09/20/2023       [facility]  Outside Information  Results  XR Chest 2 View (Order [id number])     XR Chest 2 View  Order: [id number]  Impression       Subtle interstitial opacities in the right perihilar region, concerning for pneumonia in the appropriate clinical setting.     Created by: Uriel Ryan MD   Signed by: Uriel Rayn MD   Signed on: 9/20/2023 8:20 AM   Location: [facility]            Narrative        EXAM: TWO-VIEW CHEST       INDICATION: Fever and chills.     COMPARISON: 1/22/2023.     FINDINGS:     LUNGS/PLEURA: Subtle interstitial opacities in the right perihilar region. No effusions.     HEART/MEDIASTINUM: Normal in size and contour. Atherosclerotic calcification aortic arch.     SUPPORT DEVICES: None.     PNEUMOTHORAX: None.     OSSEOUS STRUCTURES: No acute fracture or destructive lesion.     ADDITIONAL FINDINGS: None.  Exam End: 09/19/23 12:52 Last Resulted: 09/20/23 08:20   Received From: [facility]  Result Received: 08/22/24 11:23    View Encounter        Received Information          Assessment / Plan         Assessment   Diagnoses and all orders for this visit:    1. Severe persistent asthma without complication (Primary)  2. Environmental allergies  Previous PFT from 06/2023 revealed moderate obstructive lung disease with significant bronchodilator improvement (FEV1/FVC 54% and FEV1 62%; prebronchodilator FEV1 25%).   Previous labs notable for elevated IgE level (923). Eosinophil count normal (0.23) but still qualifying for biologic  injections.   Received initial loading dose of Dupixent from [facility] in [place] (preferred by insurance) but wasn't able to receive second dose due to high copay amount. Completed documentation for Dupixent My [employer] to receive assistance.   Continue Trelegy 200 mcg 1 puff daily.   Continue albuterol inhaler as needed.   Continue DuoNeb treatments as needed.   Continue Singulair daily.   Continue Zyrtec daily.   Continue Flonase nasal spray daily.   Continue allergy injections weekly.     -     Trelegy Ellipta 200-62.5-25 MCG/ACT inhaler; Inhale 1 puff Daily.  Dispense: 60 each; Refill: 5    Medications Discontinued During This Encounter   Medication Reason    losartan-hydrochlorothiazide (HYZAAR) 100-12.5 MG per tablet Duplicate order    predniSONE (DELTASONE) 20 MG tablet *Therapy completed    Fluticasone-Salmeterol (ADVAIR/WIXELA) 500-50 MCG/ACT DISKUS *Therapy completed    Trelegy Ellipta 200-62.5-25 MCG/ACT inhaler Reorder        New Medications Ordered This Visit   Medications    Trelegy Ellipta 200-62.5-25 MCG/ACT inhaler     Sig: Inhale 1 puff Daily.     Dispense:  60 each     Refill:  5     Please dispense a 3 month supply if cheaper     I spent 25 minutes caring for Justin on this date of service. This time includes time spent by me in the following activities:preparing for the visit, reviewing tests, obtaining and/or reviewing a separately obtained history, performing a medically appropriate examination and/or evaluation , counseling and educating the patient/family/caregiver, ordering medications, tests, or procedures, referring and communicating with other health care professionals , documenting information in the medical record, independently interpreting results and communicating that information with the patient/family/caregiver, and care coordination    Follow Up   Return in about 3 months (around 4/30/2025), or if symptoms worsen or fail to improve, for Next scheduled follow  up.    Patient was given instructions and counseling regarding his condition or for health maintenance advice. Please see specific information pulled into the AVS if appropriate.       This document has been electronically signed by Lety Harvey PA-C   January 31, 2025 14:17 EST    Dictated Utilizing Dragon Dictation: Part of this note may be an electronic transcription/translation of spoken language to printed text using the Dragon Dictation System.

## 2025-02-20 ENCOUNTER — TELEPHONE (OUTPATIENT)
Dept: PULMONOLOGY | Facility: CLINIC | Age: 65
End: 2025-02-20
Payer: COMMERCIAL

## 2025-02-20 NOTE — TELEPHONE ENCOUNTER
Dupixent rep called regarding pt's biologic. She provided a number for [facility] so the pt can schedule his delivery. Phone number is [phone]. Dupixent should be available with a $0 copay with the copay card. Left a VM for pt to call me back.

## 2025-02-28 RX ORDER — DUPILUMAB 300 MG/2ML
300 INJECTION, SOLUTION SUBCUTANEOUS
Qty: 2 ML | Refills: 23 | Status: SHIPPED | OUTPATIENT
Start: 2025-03-14

## 2025-02-28 RX ORDER — DUPILUMAB 300 MG/2ML
600 INJECTION, SOLUTION SUBCUTANEOUS ONCE
Qty: 4 ML | Refills: 0 | Status: SHIPPED | OUTPATIENT
Start: 2025-02-28 | End: 2025-02-28

## 2025-02-28 RX ORDER — DUPILUMAB 300 MG/2ML
INJECTION, SOLUTION SUBCUTANEOUS
Qty: 4 ML | Refills: 0 | Status: SHIPPED | OUTPATIENT
Start: 2025-02-28 | End: 2025-02-28

## 2025-02-28 NOTE — TELEPHONE ENCOUNTER
Confirmed with pharmacy that the last fill of Dupixent that patient received was the loading dose which he took in October 2024. There was a gap in therapy due to copay issues when he requested maintenance dose but this has resolved and now has $0 copay. Will approve fill for patient to repeat loading dose of Dupixent followed by the maintenance dose every 2 weeks.

## 2025-05-02 ENCOUNTER — OFFICE VISIT (OUTPATIENT)
Dept: PULMONOLOGY | Facility: CLINIC | Age: 65
End: 2025-05-02
Payer: COMMERCIAL

## 2025-05-02 VITALS
DIASTOLIC BLOOD PRESSURE: 90 MMHG | WEIGHT: 292.4 LBS | OXYGEN SATURATION: 94 % | HEIGHT: 73 IN | BODY MASS INDEX: 38.75 KG/M2 | SYSTOLIC BLOOD PRESSURE: 138 MMHG | HEART RATE: 74 BPM | TEMPERATURE: 98.2 F

## 2025-05-02 DIAGNOSIS — J45.50 SEVERE PERSISTENT ASTHMA WITHOUT COMPLICATION: Primary | ICD-10-CM

## 2025-05-02 DIAGNOSIS — Z91.09 ENVIRONMENTAL ALLERGIES: ICD-10-CM

## 2025-05-02 RX ORDER — FLUTICASONE PROPIONATE AND SALMETEROL 500; 50 UG/1; UG/1
POWDER RESPIRATORY (INHALATION)
COMMUNITY
Start: 2025-04-16

## 2025-05-02 NOTE — PROGRESS NOTES
Subjective      Chief Complaint  Severe persistent asthma without complication    Subjective      History of Present Illness  Justin Miller is a 64 y.o. male who presents today to [facility] with past medical history of essential hypertension, GERD, and asthma who presents today for Severe persistent asthma without complication. This visit is a follow up appointment.     Severe persistent asthma without complication:  Patient was referred by allergy and asthma for further evaluation of cough. No previous smoking history. Denies having COVID-19 in the past. He has had a cough that has been going on for approximately 10 years. He states that he has been started on treatment for allergies which has seemed to help some but the cough has still not resolved. He states that at times the cough is productive. He also has congestion and post nasal drainage. The cough is worse in the morning and persists throughout the day. It is aggravated when he has to do a lot of talking which affects his ability to adequately perform his job. He denies any significant shortness of breath. He reports he is allergic to types of trees, grass, peanut butter, soy, and tomatoes. He states that he does try to avoid some of his trigger but he states that he still eats peanut butter and tomatoes at times. He states that when he was started on immunotherapy that he could initially notice an improvement in his cough but now seems that it isn't as effective. He was recently switched from Wixela to Trelegy 200 mcg and feels that he was able to tell an improvement in his symptoms. He also has an albuterol inhaler which he uses as needed and feels that it provides him with symptomatic relief. He takes Zyrtec and Singulair daily. He uses Flonase nasal spray daily. He was previously on Azelastine nasal spray but discontinued this as he wasn't noticing much symptomatic improvement. He also gets  immunotherapy weekly from [facility].     Interval history:  Patient presents today for his follow-up appointment. He states that his cough has improved since his initial visit but still present. He received another dose of Dupixent from [facility] but hasn't received anymore. He states that he can tell an improvement in his symptoms when taking the injections but since he hasn't had any additional injections his cough has returned. He also received 3 Trelegy inhalers through the mail and unsure where they came from but he hasn't received any additional. He has been using Advair Diskus which is sent in by Dr. Dowd. He continues to take his Zyrtec, Singulair, and Flonase nasal spray. He also continues to receive allergy injections weekly.          Current Outpatient Medications:     albuterol sulfate  (90 Base) MCG/ACT inhaler, , Disp: , Rfl:     cetirizine (zyrTEC) 10 MG tablet, Take 1 tablet by mouth Daily., Disp: , Rfl:     cyanocobalamin 1000 MCG/ML injection, , Disp: , Rfl:     Dupilumab (Dupixent) 300 MG/2ML solution auto-injector injection, Inject 2 mL under the skin into the appropriate area as directed Every 14 (Fourteen) Days., Disp: 2 mL, Rfl: 23    fluticasone (FLONASE) 50 MCG/ACT nasal spray, Administer 1 spray into the nostril(s) as directed by provider Daily., Disp: , Rfl:     Fluticasone-Salmeterol (ADVAIR/WIXELA) 500-50 MCG/ACT DISKUS, , Disp: , Rfl:     furosemide (LASIX) 20 MG tablet, , Disp: , Rfl:     hydrOXYzine pamoate (VISTARIL) 25 MG capsule, , Disp: , Rfl:     ipratropium-albuterol (DUO-NEB) 0.5-2.5 mg/3 ml nebulizer, Take 3 mL by nebulization Every 4 (Four) Hours As Needed for Wheezing or Shortness of Air., Disp: 360 mL, Rfl: 3    ketoconazole (NIZORAL) 2 % cream, , Disp: , Rfl:     ketoconazole (NIZORAL) 2 % shampoo, , Disp: , Rfl:     levocetirizine (XYZAL) 5 MG tablet, , Disp: , Rfl:     losartan-hydrochlorothiazide (HYZAAR) 100-25 MG per tablet, , Disp: , Rfl:  "    montelukast (SINGULAIR) 10 MG tablet, , Disp: , Rfl:     omeprazole (priLOSEC) 20 MG capsule, , Disp: , Rfl:     rosuvastatin (CRESTOR) 20 MG tablet, , Disp: , Rfl:     Trelegy Ellipta 200-62.5-25 MCG/ACT inhaler, Inhale 1 puff Daily. (Patient not taking: Reported on 5/2/2025), Disp: 60 each, Rfl: 5      Allergies   Allergen Reactions    Azithromycin Headache       Objective     Objective   Vital Signs:  /90   Pulse 74   Temp 98.2 °F (36.8 °C)   Ht 185.4 cm (73\")   Wt 133 kg (292 lb 6.4 oz)   SpO2 94%   BMI 38.58 kg/m²   Estimated body mass index is 38.58 kg/m² as calculated from the following:    Height as of this encounter: 185.4 cm (73\").    Weight as of this encounter: 133 kg (292 lb 6.4 oz).    Past Medical History:   Diagnosis Date    Acid reflux     Asthma     Childhood asthma     Hypertension      History reviewed. No pertinent surgical history.  Social History     Socioeconomic History    Marital status:    Tobacco Use    Smoking status: Never     Passive exposure: Never    Smokeless tobacco: Never   Vaping Use    Vaping status: Never Used   Substance and Sexual Activity    Alcohol use: Not Currently    Drug use: Not Currently    Sexual activity: Defer      Physical Exam  Constitutional:       General: He is awake.      Appearance: Normal appearance. He is obese.   HENT:      Head: Normocephalic and atraumatic.      Nose: Nose normal.      Mouth/Throat:      Mouth: Mucous membranes are moist.      Pharynx: Oropharynx is clear.   Eyes:      Conjunctiva/sclera: Conjunctivae normal.      Pupils: Pupils are equal, round, and reactive to light.   Cardiovascular:      Rate and Rhythm: Normal rate and regular rhythm.      Pulses: Normal pulses.      Heart sounds: Normal heart sounds. No murmur heard.     No friction rub. No gallop.   Pulmonary:      Effort: Pulmonary effort is normal. No tachypnea, accessory muscle usage or respiratory distress.      Breath sounds: Normal breath sounds. No " "decreased breath sounds, wheezing, rhonchi or rales.   Musculoskeletal:         General: Normal range of motion.      Cervical back: Full passive range of motion without pain and normal range of motion.   Skin:     General: Skin is warm and dry.   Neurological:      General: No focal deficit present.      Mental Status: He is alert. Mental status is at baseline.   Psychiatric:         Mood and Affect: Mood normal.         Behavior: Behavior normal. Behavior is cooperative.         Thought Content: Thought content normal.        Result Review :  The following labs and radiology results have been reviewed.    Lab Review:   No results found for: \"FEV1\", \"FVC\", \"HJH3VNJ\", \"TLC\", \"DLCO\"  No visits with results within 3 Month(s) from this visit.   Latest known visit with results is:   Lab on 09/30/2024   Component Date Value Ref Range Status    IgE 09/30/2024 923 (H)  6 - 495 IU/mL Final    WBC 09/30/2024 7.34  3.40 - 10.80 10*3/mm3 Final    RBC 09/30/2024 4.45  4.14 - 5.80 10*6/mm3 Final    Hemoglobin 09/30/2024 14.9  13.0 - 17.7 g/dL Final    Hematocrit 09/30/2024 44.4  37.5 - 51.0 % Final    MCV 09/30/2024 99.8 (H)  79.0 - 97.0 fL Final    MCH 09/30/2024 33.5 (H)  26.6 - 33.0 pg Final    MCHC 09/30/2024 33.6  31.5 - 35.7 g/dL Final    RDW 09/30/2024 13.1  12.3 - 15.4 % Final    RDW-SD 09/30/2024 48.2  37.0 - 54.0 fl Final    MPV 09/30/2024 10.0  6.0 - 12.0 fL Final    Platelets 09/30/2024 209  140 - 450 10*3/mm3 Final    Neutrophil % 09/30/2024 51.0  42.7 - 76.0 % Final    Lymphocyte % 09/30/2024 38.5  19.6 - 45.3 % Final    Monocyte % 09/30/2024 7.3  5.0 - 12.0 % Final    Eosinophil % 09/30/2024 3.1  0.3 - 6.2 % Final    Basophil % 09/30/2024 0.0  0.0 - 1.5 % Final    Neutrophils Absolute 09/30/2024 3.74  1.70 - 7.00 10*3/mm3 Final    Lymphocytes Absolute 09/30/2024 2.83  0.70 - 3.10 10*3/mm3 Final    Monocytes Absolute 09/30/2024 0.54  0.10 - 0.90 10*3/mm3 Final    Eosinophils Absolute 09/30/2024 0.23  0.00 - 0.40 " 10*3/mm3 Final    Basophils Absolute 09/30/2024 0.00  0.00 - 0.20 10*3/mm3 Final    Poikilocytes 09/30/2024 Mod/2+  None Seen Final    Polychromasia 09/30/2024 Slight/1+  None Seen Final    WBC Morphology 09/30/2024 Normal  Normal Final    Platelet Morphology 09/30/2024 Normal  Normal Final        Imaging Results (Most Recent)       None            Radiology Review:   Imaging Results (Last 72 Hours)       ** No results found for the last 72 hours. **          Reviewed PFT from 06/01/2023      Reviewed chest XR report from 09/20/2023       [facility]  Outside Information  Results  XR Chest 2 View (Order [id number])     XR Chest 2 View  Order: [id number]  Impression       Subtle interstitial opacities in the right perihilar region, concerning for pneumonia in the appropriate clinical setting.     Created by: Uriel Ryan MD   Signed by: Uriel Ryan MD   Signed on: 9/20/2023 8:20 AM   Location: [facility]            Narrative        EXAM: TWO-VIEW CHEST       INDICATION: Fever and chills.     COMPARISON: 1/22/2023.     FINDINGS:     LUNGS/PLEURA: Subtle interstitial opacities in the right perihilar region. No effusions.     HEART/MEDIASTINUM: Normal in size and contour. Atherosclerotic calcification aortic arch.     SUPPORT DEVICES: None.     PNEUMOTHORAX: None.     OSSEOUS STRUCTURES: No acute fracture or destructive lesion.     ADDITIONAL FINDINGS: None.  Exam End: 09/19/23 12:52 Last Resulted: 09/20/23 08:20   Received From: [facility]  Result Received: 08/22/24 11:23    View Encounter        Received Information          Assessment / Plan         Assessment   Diagnoses and all orders for this visit:    1. Severe persistent asthma without complication (Primary)  2. Environmental allergies  Previous PFT from 06/2023 revealed moderate obstructive lung disease with significant bronchodilator improvement (FEV1/FVC 54% and FEV1 62%; prebronchodilator FEV1 25%).   Previous labs notable for elevated IgE level (923). Eosinophil  "count normal (0.23) but still qualifying for biologic injections.   Not receiving continuous fills through [facility]. Further assessed this with calls to [facility] and [facility] as noted below:  Contacted [facility] and noted to have issues with copay card on file. Reports that account is in \"Maximizer\" and patient recommended to call to answer a couple of questions so that this can be reversed and can rebill claim. Provided patient with # to call ([phone]).   Contacted [facility] and has additional refills on file just needs to call to request once issue with copay card is resolved. Will need to call 1 week in advance prior to each refill. Provided with # for patient to call ([phone] option 2).   Received 3-month supply of Trelegy inhaler through the mail but unsure of where it came from.   Contacted [facility] where script was originally sent but pharm [facility] reports that inhaler wasn't mailed from there. Inhaler needs PA so will submit this and recheck next week if approved.   Continue albuterol inhaler as needed.   Continue DuoNeb treatments as needed.   Continue Singulair daily.   Continue Zyrtec daily.   Continue Flonase nasal spray daily.   Continue allergy injections weekly.      There are no discontinued medications.       No orders of the defined types were placed in this encounter.    I spent >40 minutes caring for Justin on this date of service. This time includes time spent by me in the following activities:preparing for the visit, reviewing tests, obtaining and/or reviewing a separately obtained history, performing a medically appropriate examination and/or evaluation , counseling and educating the patient/family/caregiver, ordering medications, tests, or procedures, referring and communicating with other health care professionals , documenting information in the medical record, independently interpreting results and communicating " that information with the patient/family/caregiver, and care coordination    Follow Up   Return in about 4 weeks (around 5/30/2025), or if symptoms worsen or fail to improve, for Next scheduled follow up.    Patient was given instructions and counseling regarding his condition or for health maintenance advice. Please see specific information pulled into the AVS if appropriate.       This document has been electronically signed by Lety Harvey PA-C   May 2, 2025 15:52 EDT    Dictated Utilizing Dragon Dictation: Part of this note may be an electronic transcription/translation of spoken language to printed text using the Dragon Dictation System.

## 2025-05-12 RX ORDER — FLUTICASONE FUROATE, UMECLIDINIUM BROMIDE AND VILANTEROL TRIFENATATE 200; 62.5; 25 UG/1; UG/1; UG/1
1 POWDER RESPIRATORY (INHALATION)
Qty: 60 EACH | Refills: 5 | Status: SHIPPED | OUTPATIENT
Start: 2025-05-12

## 2025-06-16 ENCOUNTER — OFFICE VISIT (OUTPATIENT)
Dept: PULMONOLOGY | Facility: CLINIC | Age: 65
End: 2025-06-16
Payer: COMMERCIAL

## 2025-06-16 VITALS
HEIGHT: 73 IN | BODY MASS INDEX: 38.22 KG/M2 | SYSTOLIC BLOOD PRESSURE: 136 MMHG | WEIGHT: 288.4 LBS | DIASTOLIC BLOOD PRESSURE: 78 MMHG | TEMPERATURE: 97.5 F | HEART RATE: 78 BPM | OXYGEN SATURATION: 92 %

## 2025-06-16 DIAGNOSIS — J45.50 SEVERE PERSISTENT ASTHMA WITHOUT COMPLICATION: Primary | ICD-10-CM

## 2025-06-16 DIAGNOSIS — Z91.09 ENVIRONMENTAL ALLERGIES: ICD-10-CM

## 2025-06-16 RX ORDER — METHYLPREDNISOLONE 4 MG/1
TABLET ORAL
Qty: 21 TABLET | Refills: 0 | Status: SHIPPED | OUTPATIENT
Start: 2025-06-16

## 2025-06-16 NOTE — PROGRESS NOTES
Subjective      Chief Complaint  Severe persistent asthma without complication (PT is still coughing and states he's been having trouble sleeping. )    Subjective      History of Present Illness  Justin Miller is a 64 y.o. male who presents today to [facility] with past medical history of essential hypertension, GERD, and asthma who presents today for Severe persistent asthma without complication (PT is still coughing and states he's been having trouble sleeping. ). This visit is a follow up appointment.     Severe persistent asthma without complication (PT is still coughing and states he's been having trouble sleeping. ):  Patient was referred by allergy and asthma for further evaluation of cough. No previous smoking history. Denies having COVID-19 in the past. He has had a cough that has been going on for approximately 10 years. He states that he has been started on treatment for allergies which has seemed to help some but the cough has still not resolved. He states that at times the cough is productive. He also has congestion and post nasal drainage. The cough is worse in the morning and persists throughout the day. It is aggravated when he has to do a lot of talking which affects his ability to adequately perform his job. He denies any significant shortness of breath. He reports he is allergic to types of trees, grass, peanut butter, soy, and tomatoes. He states that he does try to avoid some of his trigger but he states that he still eats peanut butter and tomatoes at times. He states that when he was started on immunotherapy that he could initially notice an improvement in his cough but now seems that it isn't as effective. He was recently switched from Wixela to Trelegy 200 mcg and feels that he was able to tell an improvement in his symptoms. He also has an albuterol inhaler which he uses as needed and feels that it provides him with symptomatic  relief. He takes Zyrtec and Singulair daily. He uses Flonase nasal spray daily. He was previously on Azelastine nasal spray but discontinued this as he wasn't noticing much symptomatic improvement. He also gets immunotherapy weekly from [facility].     Interval history:  Patient presents today for his follow-up visit. He states that he has been having issues with a cough recently and is worse at night. He states that he has had increased congestion and postnasal drainage as well. He had to use his albuterol Saturday night to get relief of his symptoms and feels that it helped. He has been compliant with using his Trelegy 200 mcg inhaler daily. He continues to use Zyrtec, Singulair, and Flonase nasal spray daily. He also receives allergy injections weekly. He has been able to receive Dupixent injections intermittently but we have been having issues with copay card and unable to get refills of the maintenance dose. When he receives the Dupixent injection he feels that his symptoms are better controlled.        Current Outpatient Medications:     albuterol sulfate  (90 Base) MCG/ACT inhaler, , Disp: , Rfl:     cetirizine (zyrTEC) 10 MG tablet, Take 1 tablet by mouth Daily., Disp: , Rfl:     cyanocobalamin 1000 MCG/ML injection, , Disp: , Rfl:     Dupilumab (Dupixent) 300 MG/2ML solution auto-injector injection, Inject 2 mL under the skin into the appropriate area as directed Every 14 (Fourteen) Days., Disp: 2 mL, Rfl: 23    fluticasone (FLONASE) 50 MCG/ACT nasal spray, Administer 1 spray into the nostril(s) as directed by provider Daily., Disp: , Rfl:     Fluticasone-Salmeterol (ADVAIR/WIXELA) 500-50 MCG/ACT DISKUS, , Disp: , Rfl:     furosemide (LASIX) 20 MG tablet, , Disp: , Rfl:     hydrOXYzine pamoate (VISTARIL) 25 MG capsule, , Disp: , Rfl:     ipratropium-albuterol (DUO-NEB) 0.5-2.5 mg/3 ml nebulizer, Take 3 mL by nebulization Every 4 (Four) Hours As Needed for Wheezing or Shortness of Air., Disp:  "360 mL, Rfl: 3    ketoconazole (NIZORAL) 2 % cream, , Disp: , Rfl:     ketoconazole (NIZORAL) 2 % shampoo, , Disp: , Rfl:     levocetirizine (XYZAL) 5 MG tablet, , Disp: , Rfl:     losartan-hydrochlorothiazide (HYZAAR) 100-25 MG per tablet, , Disp: , Rfl:     montelukast (SINGULAIR) 10 MG tablet, , Disp: , Rfl:     omeprazole (priLOSEC) 20 MG capsule, , Disp: , Rfl:     rosuvastatin (CRESTOR) 20 MG tablet, , Disp: , Rfl:     Trelegy Ellipta 200-62.5-25 MCG/ACT inhaler, Inhale 1 puff Daily., Disp: 60 each, Rfl: 5    methylPREDNISolone (MEDROL) 4 MG dose pack, Take as directed on package instructions., Disp: 21 tablet, Rfl: 0      Allergies   Allergen Reactions    Azithromycin Headache       Objective     Objective   Vital Signs:  /78   Pulse 78   Temp 97.5 °F (36.4 °C)   Ht 185.4 cm (72.99\")   Wt 131 kg (288 lb 6.4 oz)   SpO2 92%   BMI 38.06 kg/m²   Estimated body mass index is 38.06 kg/m² as calculated from the following:    Height as of this encounter: 185.4 cm (72.99\").    Weight as of this encounter: 131 kg (288 lb 6.4 oz).    Past Medical History:   Diagnosis Date    Acid reflux     Asthma     Childhood asthma     Hypertension      History reviewed. No pertinent surgical history.  Social History     Socioeconomic History    Marital status:    Tobacco Use    Smoking status: Never     Passive exposure: Never    Smokeless tobacco: Never   Vaping Use    Vaping status: Never Used   Substance and Sexual Activity    Alcohol use: Not Currently    Drug use: Not Currently    Sexual activity: Defer      Physical Exam  Constitutional:       General: He is awake.      Appearance: Normal appearance. He is obese.   HENT:      Head: Normocephalic and atraumatic.      Nose: Congestion present.      Mouth/Throat:      Mouth: Mucous membranes are moist.      Pharynx: Oropharynx is clear.   Eyes:      Conjunctiva/sclera: Conjunctivae normal.      Pupils: Pupils are equal, round, and reactive to light. " "  Cardiovascular:      Rate and Rhythm: Normal rate and regular rhythm.      Pulses: Normal pulses.      Heart sounds: Normal heart sounds. No murmur heard.     No friction rub. No gallop.   Pulmonary:      Effort: Pulmonary effort is normal. No tachypnea, accessory muscle usage or respiratory distress.      Breath sounds: Normal breath sounds. No decreased breath sounds, wheezing, rhonchi or rales.   Musculoskeletal:         General: Normal range of motion.      Cervical back: Full passive range of motion without pain and normal range of motion.   Skin:     General: Skin is warm and dry.   Neurological:      General: No focal deficit present.      Mental Status: He is alert. Mental status is at baseline.   Psychiatric:         Mood and Affect: Mood normal.         Behavior: Behavior normal. Behavior is cooperative.         Thought Content: Thought content normal.        Result Review :  The following labs and radiology results have been reviewed.    Lab Review:   No results found for: \"FEV1\", \"FVC\", \"IRC5RYI\", \"TLC\", \"DLCO\"  No visits with results within 3 Month(s) from this visit.   Latest known visit with results is:   Lab on 09/30/2024   Component Date Value Ref Range Status    IgE 09/30/2024 923 (H)  6 - 495 IU/mL Final    WBC 09/30/2024 7.34  3.40 - 10.80 10*3/mm3 Final    RBC 09/30/2024 4.45  4.14 - 5.80 10*6/mm3 Final    Hemoglobin 09/30/2024 14.9  13.0 - 17.7 g/dL Final    Hematocrit 09/30/2024 44.4  37.5 - 51.0 % Final    MCV 09/30/2024 99.8 (H)  79.0 - 97.0 fL Final    MCH 09/30/2024 33.5 (H)  26.6 - 33.0 pg Final    MCHC 09/30/2024 33.6  31.5 - 35.7 g/dL Final    RDW 09/30/2024 13.1  12.3 - 15.4 % Final    RDW-SD 09/30/2024 48.2  37.0 - 54.0 fl Final    MPV 09/30/2024 10.0  6.0 - 12.0 fL Final    Platelets 09/30/2024 209  140 - 450 10*3/mm3 Final    Neutrophil % 09/30/2024 51.0  42.7 - 76.0 % Final    Lymphocyte % 09/30/2024 38.5  19.6 - 45.3 % Final    Monocyte % 09/30/2024 7.3  5.0 - 12.0 % Final    " Eosinophil % 09/30/2024 3.1  0.3 - 6.2 % Final    Basophil % 09/30/2024 0.0  0.0 - 1.5 % Final    Neutrophils Absolute 09/30/2024 3.74  1.70 - 7.00 10*3/mm3 Final    Lymphocytes Absolute 09/30/2024 2.83  0.70 - 3.10 10*3/mm3 Final    Monocytes Absolute 09/30/2024 0.54  0.10 - 0.90 10*3/mm3 Final    Eosinophils Absolute 09/30/2024 0.23  0.00 - 0.40 10*3/mm3 Final    Basophils Absolute 09/30/2024 0.00  0.00 - 0.20 10*3/mm3 Final    Poikilocytes 09/30/2024 Mod/2+  None Seen Final    Polychromasia 09/30/2024 Slight/1+  None Seen Final    WBC Morphology 09/30/2024 Normal  Normal Final    Platelet Morphology 09/30/2024 Normal  Normal Final        Imaging Results (Most Recent)       None            Radiology Review:   Imaging Results (Last 72 Hours)       ** No results found for the last 72 hours. **          Reviewed PFT from 06/01/2023      Reviewed chest XR report from 09/20/2023       [facility]  Outside Information  Results  XR Chest 2 View (Order [id number])     XR Chest 2 View  Order: [id number]  Impression       Subtle interstitial opacities in the right perihilar region, concerning for pneumonia in the appropriate clinical setting.     Created by: Uriel Ryan MD   Signed by: Uriel Ryan MD   Signed on: 9/20/2023 8:20 AM   Location: [facility]            Narrative        EXAM: TWO-VIEW CHEST       INDICATION: Fever and chills.     COMPARISON: 1/22/2023.     FINDINGS:     LUNGS/PLEURA: Subtle interstitial opacities in the right perihilar region. No effusions.     HEART/MEDIASTINUM: Normal in size and contour. Atherosclerotic calcification aortic arch.     SUPPORT DEVICES: None.     PNEUMOTHORAX: None.     OSSEOUS STRUCTURES: No acute fracture or destructive lesion.     ADDITIONAL FINDINGS: None.  Exam End: 09/19/23 12:52 Last Resulted: 09/20/23 08:20   Received From: [facility]  Result Received: 08/22/24 11:23    View Encounter        Received Information          Assessment / Plan         Assessment   Diagnoses and all  orders for this visit:    1. Severe persistent asthma without complication (Primary)  2. Environmental allergies  Previous PFT from 06/2023 revealed moderate obstructive lung disease with significant bronchodilator improvement (FEV1/FVC 54% and FEV1 62%; prebronchodilator FEV1 25%).   Previous labs notable for elevated IgE level (923). Eosinophil count normal (0.23) but still qualifying for biologic injections.   Receives intermittent loading dose of Dupixent injections but having difficulty with receiving refills of maintenance dose of injection due to copay issues.   Initially noted to reach [employer] category and patient contacted [facility] [employer] [facility] to answer the requested questions. Advised to reach back out to pharmacy to reprocess and rerun the medication but copay card still wouldn't work.   Contacted [facility] Copay Support and pharmacy suspected to be running the incorrect copay card and provided with new copay card information (BIN [id number], PCN [id number], GRP, [id number], ID [id number]). Supplied pharmacy with new copay information but card still won't go through. Pharmacy to send information to insurance team to correct issues and will communicate with Dup[facility]ent [facility].   Supplied patient with 2 sample injections for 1 month supply to use in the meantime until copay card issues are corrected. Will contact pharmacy back in the upcoming future to check in.  Continue Trelegy 200 mcg 1 puff daily.   Continue albuterol inhaler as needed.   Continue DuoNeb treatments as needed.   Continue Singulair nightly.   Continue Zyrtec daily.   Continue Flonase nasal spray daily.   Continue allergy injections weekly.   Will order Medrol dose pack for treatment of acute exacerbation.     -     methylPREDNISolone (MEDROL) 4 MG dose pack; Take as directed on package instructions.  Dispense: 21 tablet; Refill: 0       New Medications Ordered This Visit   Medications    methylPREDNISolone (MEDROL) 4 MG dose pack     Sig: Take as  directed on package instructions.     Dispense:  21 tablet     Refill:  0     I spent >40 minutes caring for Justin on this date of service. This time includes time spent by me in the following activities:preparing for the visit, reviewing tests, obtaining and/or reviewing a separately obtained history, performing a medically appropriate examination and/or evaluation , counseling and educating the patient/family/caregiver, ordering medications, tests, or procedures, referring and communicating with other health care professionals , documenting information in the medical record, independently interpreting results and communicating that information with the patient/family/caregiver, and care coordination    Follow Up   Return in about 4 weeks (around 7/14/2025), or if symptoms worsen or fail to improve, for Next scheduled follow up.    Patient was given instructions and counseling regarding his condition or for health maintenance advice. Please see specific information pulled into the AVS if appropriate.       This document has been electronically signed by Lety Harvey PA-C   June 16, 2025 11:24 EDT    Dictated Utilizing Dragon Dictation: Part of this note may be an electronic transcription/translation of spoken language to printed text using the Dragon Dictation System.

## 2025-07-14 ENCOUNTER — OFFICE VISIT (OUTPATIENT)
Dept: PULMONOLOGY | Facility: CLINIC | Age: 65
End: 2025-07-14
Payer: COMMERCIAL

## 2025-07-14 VITALS
DIASTOLIC BLOOD PRESSURE: 88 MMHG | OXYGEN SATURATION: 93 % | TEMPERATURE: 99.3 F | SYSTOLIC BLOOD PRESSURE: 140 MMHG | HEIGHT: 73 IN | WEIGHT: 287.8 LBS | RESPIRATION RATE: 18 BRPM | HEART RATE: 79 BPM | BODY MASS INDEX: 38.14 KG/M2

## 2025-07-14 DIAGNOSIS — J45.50 SEVERE PERSISTENT ASTHMA WITHOUT COMPLICATION: Primary | ICD-10-CM

## 2025-07-14 DIAGNOSIS — Z91.09 ENVIRONMENTAL ALLERGIES: ICD-10-CM

## 2025-07-14 RX ORDER — FLUTICASONE FUROATE, UMECLIDINIUM BROMIDE AND VILANTEROL TRIFENATATE 200; 62.5; 25 UG/1; UG/1; UG/1
1 POWDER RESPIRATORY (INHALATION)
Qty: 60 EACH | Refills: 5 | Status: SHIPPED | OUTPATIENT
Start: 2025-07-14

## 2025-07-14 NOTE — PROGRESS NOTES
Subjective      Chief Complaint  Follow-up (Severe persistent asthma without complication)    Subjective      History of Present Illness  Justin Miller is a 64 y.o. male who presents today to [facility] with past medical history of essential hypertension, GERD, and asthma who presents today for Follow-up (Severe persistent asthma without complication). This visit is a follow up appointment.     Follow-up (Severe persistent asthma without complication):  Patient was referred by allergy and asthma for further evaluation of cough. No previous smoking history. Denies having COVID-19 in the past. He has had a cough that has been going on for approximately 10 years. He states that he has been started on treatment for allergies which has seemed to help some but the cough has still not resolved. He states that at times the cough is productive. He also has congestion and post nasal drainage. The cough is worse in the morning and persists throughout the day. It is aggravated when he has to do a lot of talking which affects his ability to adequately perform his job. He denies any significant shortness of breath. He reports he is allergic to types of trees, grass, peanut butter, soy, and tomatoes. He states that he does try to avoid some of his trigger but he states that he still eats peanut butter and tomatoes at times. He states that when he was started on immunotherapy that he could initially notice an improvement in his cough but now seems that it isn't as effective. He was recently switched from Wixela to Trelegy 200 mcg and feels that he was able to tell an improvement in his symptoms. He also has an albuterol inhaler which he uses as needed and feels that it provides him with symptomatic relief. He takes Zyrtec and Singulair daily. He uses Flonase nasal spray daily. He was previously on Azelastine nasal spray but discontinued this as he wasn't noticing much  symptomatic improvement. He also gets immunotherapy weekly from [facility].     Interval history:  Patient presents today for his follow-up appointment. He states that he still has a cough intermittently but reports that overall it is improved. He reports that previously he had issues sleeping and wasn't able to hold a conversation without coughing. He states that he ran out of refills for his trelegy inhaler so has been using the generic advair inhaler that was prescribed by his PCP. He has been receiving Dupixent consistently from the pharmacy and has received about 4 injections so far. He continues to take Zyrtec, Singulair, and Flonase daily. He takes allergy injections weekly. He doesn't utilize his albuterol inhaler or nebulizer treatments frequently. He states that he has postnasal drainage and feels congested especially on his right side at night when lying down. He has never been evaluated by an ENT in the past.      Current Outpatient Medications:     albuterol sulfate  (90 Base) MCG/ACT inhaler, , Disp: , Rfl:     cetirizine (zyrTEC) 10 MG tablet, Take 1 tablet by mouth Daily., Disp: , Rfl:     cyanocobalamin 1000 MCG/ML injection, , Disp: , Rfl:     Dupilumab (Dupixent) 300 MG/2ML solution auto-injector injection, Inject 2 mL under the skin into the appropriate area as directed Every 14 (Fourteen) Days., Disp: 2 mL, Rfl: 23    fluticasone (FLONASE) 50 MCG/ACT nasal spray, Administer 1 spray into the nostril(s) as directed by provider Daily., Disp: , Rfl:     furosemide (LASIX) 20 MG tablet, , Disp: , Rfl:     hydrOXYzine pamoate (VISTARIL) 25 MG capsule, , Disp: , Rfl:     ipratropium-albuterol (DUO-NEB) 0.5-2.5 mg/3 ml nebulizer, Take 3 mL by nebulization Every 4 (Four) Hours As Needed for Wheezing or Shortness of Air., Disp: 360 mL, Rfl: 3    losartan-hydrochlorothiazide (HYZAAR) 100-25 MG per tablet, , Disp: , Rfl:     montelukast (SINGULAIR) 10 MG tablet, , Disp: , Rfl:     omeprazole  "(priLOSEC) 20 MG capsule, , Disp: , Rfl:     rosuvastatin (CRESTOR) 20 MG tablet, , Disp: , Rfl:     Trelegy Ellipta 200-62.5-25 MCG/ACT inhaler, Inhale 1 puff Daily., Disp: 60 each, Rfl: 5    ketoconazole (NIZORAL) 2 % cream, , Disp: , Rfl:     ketoconazole (NIZORAL) 2 % shampoo, , Disp: , Rfl:     methylPREDNISolone (MEDROL) 4 MG dose pack, Take as directed on package instructions., Disp: 21 tablet, Rfl: 0      Allergies   Allergen Reactions    Azithromycin Headache       Objective     Objective   Vital Signs:  /88 (BP Location: Right arm, Patient Position: Sitting, Cuff Size: Adult)   Pulse 79   Temp 99.3 °F (37.4 °C)   Resp 18   Ht 185.4 cm (72.99\")   Wt 131 kg (287 lb 12.8 oz)   SpO2 93%   BMI 37.98 kg/m²   Estimated body mass index is 37.98 kg/m² as calculated from the following:    Height as of this encounter: 185.4 cm (72.99\").    Weight as of this encounter: 131 kg (287 lb 12.8 oz).    Past Medical History:   Diagnosis Date    Acid reflux     Asthma     Childhood asthma     Hypertension      History reviewed. No pertinent surgical history.  Social History     Socioeconomic History    Marital status:    Tobacco Use    Smoking status: Never     Passive exposure: Never    Smokeless tobacco: Never   Vaping Use    Vaping status: Never Used   Substance and Sexual Activity    Alcohol use: Not Currently    Drug use: Not Currently    Sexual activity: Defer      Physical Exam  Constitutional:       General: He is awake.      Appearance: Normal appearance. He is obese.   HENT:      Head: Normocephalic and atraumatic.      Nose: Congestion present.      Mouth/Throat:      Mouth: Mucous membranes are moist.      Pharynx: Oropharynx is clear.   Eyes:      Conjunctiva/sclera: Conjunctivae normal.      Pupils: Pupils are equal, round, and reactive to light.   Cardiovascular:      Rate and Rhythm: Normal rate and regular rhythm.      Pulses: Normal pulses.      Heart sounds: Normal heart sounds. No " "murmur heard.     No friction rub. No gallop.   Pulmonary:      Effort: Pulmonary effort is normal. No tachypnea, accessory muscle usage or respiratory distress.      Breath sounds: Normal breath sounds. No decreased breath sounds, wheezing, rhonchi or rales.   Musculoskeletal:         General: Normal range of motion.      Cervical back: Full passive range of motion without pain and normal range of motion.   Skin:     General: Skin is warm and dry.   Neurological:      General: No focal deficit present.      Mental Status: He is alert. Mental status is at baseline.   Psychiatric:         Mood and Affect: Mood normal.         Behavior: Behavior normal. Behavior is cooperative.         Thought Content: Thought content normal.        Result Review :  The following labs and radiology results have been reviewed.    Lab Review:   No results found for: \"FEV1\", \"FVC\", \"OYL4YRY\", \"TLC\", \"DLCO\"  No visits with results within 3 Month(s) from this visit.   Latest known visit with results is:   Lab on 09/30/2024   Component Date Value Ref Range Status    IgE 09/30/2024 923 (H)  6 - 495 IU/mL Final    WBC 09/30/2024 7.34  3.40 - 10.80 10*3/mm3 Final    RBC 09/30/2024 4.45  4.14 - 5.80 10*6/mm3 Final    Hemoglobin 09/30/2024 14.9  13.0 - 17.7 g/dL Final    Hematocrit 09/30/2024 44.4  37.5 - 51.0 % Final    MCV 09/30/2024 99.8 (H)  79.0 - 97.0 fL Final    MCH 09/30/2024 33.5 (H)  26.6 - 33.0 pg Final    MCHC 09/30/2024 33.6  31.5 - 35.7 g/dL Final    RDW 09/30/2024 13.1  12.3 - 15.4 % Final    RDW-SD 09/30/2024 48.2  37.0 - 54.0 fl Final    MPV 09/30/2024 10.0  6.0 - 12.0 fL Final    Platelets 09/30/2024 209  140 - 450 10*3/mm3 Final    Neutrophil % 09/30/2024 51.0  42.7 - 76.0 % Final    Lymphocyte % 09/30/2024 38.5  19.6 - 45.3 % Final    Monocyte % 09/30/2024 7.3  5.0 - 12.0 % Final    Eosinophil % 09/30/2024 3.1  0.3 - 6.2 % Final    Basophil % 09/30/2024 0.0  0.0 - 1.5 % Final    Neutrophils Absolute 09/30/2024 3.74  1.70 - " 7.00 10*3/mm3 Final    Lymphocytes Absolute 09/30/2024 2.83  0.70 - 3.10 10*3/mm3 Final    Monocytes Absolute 09/30/2024 0.54  0.10 - 0.90 10*3/mm3 Final    Eosinophils Absolute 09/30/2024 0.23  0.00 - 0.40 10*3/mm3 Final    Basophils Absolute 09/30/2024 0.00  0.00 - 0.20 10*3/mm3 Final    Poikilocytes 09/30/2024 Mod/2+  None Seen Final    Polychromasia 09/30/2024 Slight/1+  None Seen Final    WBC Morphology 09/30/2024 Normal  Normal Final    Platelet Morphology 09/30/2024 Normal  Normal Final        Imaging Results (Most Recent)       None            Radiology Review:   Imaging Results (Last 72 Hours)       ** No results found for the last 72 hours. **          Reviewed PFT from 06/01/2023          Assessment / Plan         Assessment   Diagnoses and all orders for this visit:    1. Severe persistent asthma without complication (Primary)  2. Environmental allergies  Previous PFT from 06/2023 revealed moderate obstructive lung disease with significant bronchodilator improvement (FEV1/FVC 54% and FEV1 62%; prebronchodilator FEV1 25%).   Previous labs notable for elevated IgE level (923). Eosinophil count normal (0.23) but still qualifying for biologic injections.   Continue albuterol inhaler as needed.   Continue DuoNeb treatments as needed.   Continue Trelegy 200 mcg inhaler 1 puff daily (requires a PA).  Continue Dupixent injections as scheduled.   Continue Singulair nightly.   Continue Zyrtec daily.   Continue Flonase nasal spray daily.   Continue allergy injections weekly.   Will refer to ENT to evaluate for nasal polyps due to persistent symptoms despite maximal treatment regimen.    -     Ambulatory Referral to ENT (Otolaryngology)  -     Trelegy Ellipta 200-62.5-25 MCG/ACT inhaler; Inhale 1 puff Daily.  Dispense: 60 each; Refill: 5    New Medications Ordered This Visit   Medications    Trelegy Ellipta 200-62.5-25 MCG/ACT inhaler     Sig: Inhale 1 puff Daily.     Dispense:  60 each     Refill:  5     Please  dispense a 3 month supply if cheaper     I spent >40 minutes caring for Justin on this date of service. This time includes time spent by me in the following activities:preparing for the visit, reviewing tests, obtaining and/or reviewing a separately obtained history, performing a medically appropriate examination and/or evaluation , counseling and educating the patient/family/caregiver, ordering medications, tests, or procedures, referring and communicating with other health care professionals , documenting information in the medical record, independently interpreting results and communicating that information with the patient/family/caregiver, and care coordination    Follow Up   Return in about 8 weeks (around 9/8/2025), or if symptoms worsen or fail to improve, for Next scheduled follow up.    Patient was given instructions and counseling regarding his condition or for health maintenance advice. Please see specific information pulled into the AVS if appropriate.       This document has been electronically signed by Lety Harvey PA-C   July 14, 2025 15:42 EDT    Dictated Utilizing Dragon Dictation: Part of this note may be an electronic transcription/translation of spoken language to printed text using the Dragon Dictation System.